# Patient Record
Sex: MALE | Race: WHITE | NOT HISPANIC OR LATINO | Employment: OTHER | URBAN - METROPOLITAN AREA
[De-identification: names, ages, dates, MRNs, and addresses within clinical notes are randomized per-mention and may not be internally consistent; named-entity substitution may affect disease eponyms.]

---

## 2019-12-18 ENCOUNTER — FOLLOW UP (OUTPATIENT)
Dept: URBAN - METROPOLITAN AREA CLINIC 79 | Facility: CLINIC | Age: 69
End: 2019-12-18

## 2019-12-18 DIAGNOSIS — H01.02B: ICD-10-CM

## 2019-12-18 DIAGNOSIS — H01.02A: ICD-10-CM

## 2019-12-18 DIAGNOSIS — H43.813: ICD-10-CM

## 2019-12-18 DIAGNOSIS — H25.13: ICD-10-CM

## 2019-12-18 PROCEDURE — 92014 COMPRE OPH EXAM EST PT 1/>: CPT

## 2019-12-18 PROCEDURE — 92136 OPHTHALMIC BIOMETRY: CPT

## 2019-12-18 PROCEDURE — 92025 CPTRIZED CORNEAL TOPOGRAPHY: CPT

## 2019-12-18 ASSESSMENT — VISUAL ACUITY
OD_CC: 20/30-1
OS_CC: 20/25+3

## 2019-12-18 ASSESSMENT — KERATOMETRY
OS_AXISANGLE2_DEGREES: 108
OD_K1POWER_DIOPTERS: 40.17
OD_K2POWER_DIOPTERS: 40.91
OS_K2POWER_DIOPTERS: 41.51
OS_AXISANGLE_DEGREES: 18
OD_AXISANGLE2_DEGREES: 69
OS_K1POWER_DIOPTERS: 40.44
OD_AXISANGLE_DEGREES: 159

## 2019-12-18 ASSESSMENT — TONOMETRY
OS_IOP_MMHG: 15
OD_IOP_MMHG: 15

## 2020-01-13 ENCOUNTER — SURGERY/PROCEDURE (OUTPATIENT)
Dept: URBAN - METROPOLITAN AREA SURGICAL CENTER 17 | Facility: SURGICAL CENTER | Age: 70
End: 2020-01-13

## 2020-01-13 DIAGNOSIS — H25.041: ICD-10-CM

## 2020-01-13 DIAGNOSIS — H25.011: ICD-10-CM

## 2020-01-13 DIAGNOSIS — H25.11: ICD-10-CM

## 2020-01-13 PROCEDURE — 66984 XCAPSL CTRC RMVL W/O ECP: CPT

## 2020-01-13 ASSESSMENT — KERATOMETRY
OD_AXISANGLE_DEGREES: 159
OS_K2POWER_DIOPTERS: 41.51
OD_K1POWER_DIOPTERS: 40.17
OS_AXISANGLE2_DEGREES: 108
OS_AXISANGLE_DEGREES: 18
OS_K1POWER_DIOPTERS: 40.44
OD_K2POWER_DIOPTERS: 40.91
OD_AXISANGLE2_DEGREES: 69

## 2020-01-14 ENCOUNTER — 1 DAY POST-OP (OUTPATIENT)
Dept: URBAN - METROPOLITAN AREA CLINIC 79 | Facility: CLINIC | Age: 70
End: 2020-01-14

## 2020-01-14 DIAGNOSIS — Z96.1: ICD-10-CM

## 2020-01-14 PROCEDURE — 99024 POSTOP FOLLOW-UP VISIT: CPT

## 2020-01-14 ASSESSMENT — KERATOMETRY
OS_AXISANGLE2_DEGREES: 108
OS_K2POWER_DIOPTERS: 41.51
OD_AXISANGLE2_DEGREES: 69
OD_K2POWER_DIOPTERS: 40.91
OD_K1POWER_DIOPTERS: 40.17
OS_AXISANGLE_DEGREES: 18
OD_AXISANGLE_DEGREES: 159
OS_K1POWER_DIOPTERS: 40.44

## 2020-01-14 ASSESSMENT — VISUAL ACUITY
OD_SC: J10
OD_SC: 20/25-1

## 2020-01-14 ASSESSMENT — TONOMETRY: OD_IOP_MMHG: 20

## 2020-01-23 ENCOUNTER — 1 WEEK POST-OP (OUTPATIENT)
Dept: URBAN - METROPOLITAN AREA CLINIC 79 | Facility: CLINIC | Age: 70
End: 2020-01-23

## 2020-01-23 DIAGNOSIS — Z96.1: ICD-10-CM

## 2020-01-23 PROCEDURE — 99024 POSTOP FOLLOW-UP VISIT: CPT

## 2020-01-23 ASSESSMENT — VISUAL ACUITY
OD_SC: J3
OD_SC: 20/25-1

## 2020-01-23 ASSESSMENT — KERATOMETRY
OS_AXISANGLE2_DEGREES: 108
OD_AXISANGLE_DEGREES: 159
OD_K1POWER_DIOPTERS: 40.17
OS_AXISANGLE_DEGREES: 18
OS_K2POWER_DIOPTERS: 41.51
OD_K2POWER_DIOPTERS: 40.91
OS_K1POWER_DIOPTERS: 40.44
OD_AXISANGLE2_DEGREES: 69

## 2020-01-23 ASSESSMENT — TONOMETRY: OD_IOP_MMHG: 20

## 2020-02-12 ENCOUNTER — ESTABLISHED COMPREHENSIVE EXAM (OUTPATIENT)
Dept: URBAN - METROPOLITAN AREA CLINIC 79 | Facility: CLINIC | Age: 70
End: 2020-02-12

## 2020-02-12 DIAGNOSIS — H52.13: ICD-10-CM

## 2020-02-12 DIAGNOSIS — Z96.1: ICD-10-CM

## 2020-02-12 PROCEDURE — 92015 DETERMINE REFRACTIVE STATE: CPT | Mod: GY

## 2020-02-12 PROCEDURE — 99024 POSTOP FOLLOW-UP VISIT: CPT

## 2020-02-12 ASSESSMENT — KERATOMETRY
OS_K1POWER_DIOPTERS: 40.44
OD_AXISANGLE2_DEGREES: 69
OD_K1POWER_DIOPTERS: 40.17
OS_K2POWER_DIOPTERS: 41.51
OS_AXISANGLE2_DEGREES: 108
OS_AXISANGLE_DEGREES: 18
OD_AXISANGLE_DEGREES: 159
OD_K2POWER_DIOPTERS: 40.91

## 2020-02-12 ASSESSMENT — TONOMETRY
OS_IOP_MMHG: 15
OD_IOP_MMHG: 15

## 2020-02-12 ASSESSMENT — VISUAL ACUITY
OD_SC: J3
OD_SC: 20/25
OS_SC: 20/40
OS_CC: J1+
OS_CC: 20/20

## 2020-06-04 ENCOUNTER — PROBLEM (OUTPATIENT)
Dept: URBAN - METROPOLITAN AREA CLINIC 79 | Facility: CLINIC | Age: 70
End: 2020-06-04

## 2020-06-04 VITALS — HEIGHT: 60 IN

## 2020-06-04 DIAGNOSIS — H00.14: ICD-10-CM

## 2020-06-04 PROCEDURE — 92012 INTRM OPH EXAM EST PATIENT: CPT

## 2020-06-04 ASSESSMENT — VISUAL ACUITY
OS_CC: 20/20
OD_SC: 20/20

## 2020-06-04 ASSESSMENT — KERATOMETRY
OD_K1POWER_DIOPTERS: 40.17
OS_K2POWER_DIOPTERS: 41.51
OS_AXISANGLE_DEGREES: 18
OD_AXISANGLE_DEGREES: 159
OS_K1POWER_DIOPTERS: 40.44
OD_AXISANGLE2_DEGREES: 69
OD_K2POWER_DIOPTERS: 40.91
OS_AXISANGLE2_DEGREES: 108

## 2020-06-16 ENCOUNTER — FOLLOW UP (OUTPATIENT)
Dept: URBAN - METROPOLITAN AREA CLINIC 79 | Facility: CLINIC | Age: 70
End: 2020-06-16

## 2020-06-16 VITALS — HEIGHT: 60 IN

## 2020-06-16 DIAGNOSIS — H00.14: ICD-10-CM

## 2020-06-16 PROCEDURE — 92012 INTRM OPH EXAM EST PATIENT: CPT

## 2020-06-16 ASSESSMENT — KERATOMETRY
OS_AXISANGLE2_DEGREES: 108
OD_AXISANGLE_DEGREES: 159
OS_K2POWER_DIOPTERS: 41.51
OD_AXISANGLE2_DEGREES: 69
OD_K1POWER_DIOPTERS: 40.17
OD_K2POWER_DIOPTERS: 40.91
OS_K1POWER_DIOPTERS: 40.44
OS_AXISANGLE_DEGREES: 18

## 2020-06-16 ASSESSMENT — VISUAL ACUITY
OD_CC: 20/25-1
OS_CC: 20/25+1

## 2020-06-16 ASSESSMENT — TONOMETRY
OD_IOP_MMHG: 16
OS_IOP_MMHG: 17

## 2020-09-08 ENCOUNTER — FOLLOW UP (OUTPATIENT)
Dept: URBAN - METROPOLITAN AREA CLINIC 79 | Facility: CLINIC | Age: 70
End: 2020-09-08

## 2020-09-08 DIAGNOSIS — H26.491: ICD-10-CM

## 2020-09-08 DIAGNOSIS — Z96.1: ICD-10-CM

## 2020-09-08 PROCEDURE — 92014 COMPRE OPH EXAM EST PT 1/>: CPT

## 2020-09-08 ASSESSMENT — KERATOMETRY
OS_AXISANGLE2_DEGREES: 108
OS_AXISANGLE_DEGREES: 18
OD_K2POWER_DIOPTERS: 40.91
OD_K1POWER_DIOPTERS: 40.17
OS_K2POWER_DIOPTERS: 41.51
OD_AXISANGLE_DEGREES: 159
OS_K1POWER_DIOPTERS: 40.44
OD_AXISANGLE2_DEGREES: 69

## 2020-09-08 ASSESSMENT — TONOMETRY
OD_IOP_MMHG: 16
OS_IOP_MMHG: 15

## 2020-09-08 ASSESSMENT — VISUAL ACUITY
OS_CC: 20/20
OD_CC: 20/20-1

## 2021-01-12 ENCOUNTER — ESTABLISHED COMPREHENSIVE EXAM (OUTPATIENT)
Dept: URBAN - METROPOLITAN AREA CLINIC 79 | Facility: CLINIC | Age: 71
End: 2021-01-12

## 2021-01-12 VITALS — HEIGHT: 60 IN

## 2021-01-12 DIAGNOSIS — Z96.1: ICD-10-CM

## 2021-01-12 DIAGNOSIS — H52.12: ICD-10-CM

## 2021-01-12 DIAGNOSIS — H52.01: ICD-10-CM

## 2021-01-12 PROCEDURE — 92015 DETERMINE REFRACTIVE STATE: CPT

## 2021-01-12 PROCEDURE — 92014 COMPRE OPH EXAM EST PT 1/>: CPT

## 2021-01-12 ASSESSMENT — VISUAL ACUITY
OD_SC: 20/25
OD_CC: 20/20
OS_SC: 20/30
OS_CC: J1+
OS_CC: 20/25
OD_CC: J1+

## 2021-01-12 ASSESSMENT — KERATOMETRY
OS_AXISANGLE_DEGREES: 18
OD_K1POWER_DIOPTERS: 40.17
OD_K2POWER_DIOPTERS: 40.91
OD_AXISANGLE2_DEGREES: 69
OS_K2POWER_DIOPTERS: 41.51
OS_K1POWER_DIOPTERS: 40.44
OS_AXISANGLE2_DEGREES: 108
OD_AXISANGLE_DEGREES: 159

## 2021-01-12 ASSESSMENT — TONOMETRY
OD_IOP_MMHG: 14
OS_IOP_MMHG: 14

## 2021-06-22 ENCOUNTER — FOLLOW UP (OUTPATIENT)
Dept: URBAN - METROPOLITAN AREA CLINIC 79 | Facility: CLINIC | Age: 71
End: 2021-06-22

## 2021-06-22 DIAGNOSIS — H25.12: ICD-10-CM

## 2021-06-22 PROCEDURE — 92014 COMPRE OPH EXAM EST PT 1/>: CPT

## 2021-06-22 ASSESSMENT — KERATOMETRY
OS_K2POWER_DIOPTERS: 41.51
OS_AXISANGLE_DEGREES: 18
OS_AXISANGLE2_DEGREES: 108
OS_K1POWER_DIOPTERS: 40.44
OD_AXISANGLE_DEGREES: 159
OD_K1POWER_DIOPTERS: 40.17
OD_K2POWER_DIOPTERS: 40.91
OD_AXISANGLE2_DEGREES: 69

## 2021-06-22 ASSESSMENT — VISUAL ACUITY
OD_CC: 20/20
OS_CC: 20/20-2

## 2022-12-13 ENCOUNTER — FOLLOW UP (OUTPATIENT)
Dept: URBAN - METROPOLITAN AREA CLINIC 79 | Facility: CLINIC | Age: 72
End: 2022-12-13

## 2022-12-13 DIAGNOSIS — H25.12: ICD-10-CM

## 2022-12-13 DIAGNOSIS — H26.491: ICD-10-CM

## 2022-12-13 PROCEDURE — 92134 CPTRZ OPH DX IMG PST SGM RTA: CPT | Mod: NC

## 2022-12-13 PROCEDURE — 92014 COMPRE OPH EXAM EST PT 1/>: CPT

## 2022-12-13 ASSESSMENT — TONOMETRY
OS_IOP_MMHG: 9
OD_IOP_MMHG: 11

## 2022-12-13 ASSESSMENT — KERATOMETRY
OS_AXISANGLE2_DEGREES: 108
OS_K2POWER_DIOPTERS: 41.51
OD_AXISANGLE_DEGREES: 159
OD_K1POWER_DIOPTERS: 40.17
OS_AXISANGLE_DEGREES: 18
OD_K2POWER_DIOPTERS: 40.91
OS_K1POWER_DIOPTERS: 40.44
OD_AXISANGLE2_DEGREES: 69

## 2022-12-13 ASSESSMENT — VISUAL ACUITY
OD_SC: 20/30+2
OD_CC: 20/25-2
OS_CC: 20/20-1

## 2022-12-20 ENCOUNTER — PROCEDURE ONLY (OUTPATIENT)
Dept: URBAN - METROPOLITAN AREA CLINIC 79 | Facility: CLINIC | Age: 72
End: 2022-12-20

## 2022-12-20 DIAGNOSIS — H26.491: ICD-10-CM

## 2022-12-20 PROCEDURE — 66821 AFTER CATARACT LASER SURGERY: CPT

## 2022-12-20 ASSESSMENT — KERATOMETRY
OS_AXISANGLE2_DEGREES: 108
OS_K1POWER_DIOPTERS: 40.44
OD_K1POWER_DIOPTERS: 40.17
OD_K2POWER_DIOPTERS: 40.91
OD_AXISANGLE2_DEGREES: 69
OS_K2POWER_DIOPTERS: 41.51
OD_AXISANGLE_DEGREES: 159
OS_AXISANGLE_DEGREES: 18

## 2022-12-20 ASSESSMENT — TONOMETRY: OD_IOP_MMHG: 17

## 2022-12-20 ASSESSMENT — VISUAL ACUITY
OS_CC: 20/20-1
OD_SC: 20/25+2

## 2023-01-03 ENCOUNTER — POST-OP CHECK (OUTPATIENT)
Dept: URBAN - METROPOLITAN AREA CLINIC 79 | Facility: CLINIC | Age: 73
End: 2023-01-03

## 2023-01-03 DIAGNOSIS — Z96.1: ICD-10-CM

## 2023-01-03 PROCEDURE — 99024 POSTOP FOLLOW-UP VISIT: CPT

## 2023-01-03 ASSESSMENT — KERATOMETRY
OS_K1POWER_DIOPTERS: 40.44
OD_K1POWER_DIOPTERS: 40.17
OD_AXISANGLE_DEGREES: 159
OS_AXISANGLE_DEGREES: 18
OD_AXISANGLE2_DEGREES: 69
OD_K2POWER_DIOPTERS: 40.91
OS_K2POWER_DIOPTERS: 41.51
OS_AXISANGLE2_DEGREES: 108

## 2023-01-03 ASSESSMENT — VISUAL ACUITY
OS_CC: 20/20
OD_SC: 20/20

## 2023-01-03 ASSESSMENT — TONOMETRY
OS_IOP_MMHG: 16
OD_IOP_MMHG: 16
OD_IOP_MMHG: 15
OS_IOP_MMHG: 15

## 2023-07-11 ENCOUNTER — ESTABLISHED COMPREHENSIVE EXAM (OUTPATIENT)
Dept: URBAN - METROPOLITAN AREA CLINIC 79 | Facility: CLINIC | Age: 73
End: 2023-07-11

## 2023-07-11 DIAGNOSIS — H25.12: ICD-10-CM

## 2023-07-11 DIAGNOSIS — H52.12: ICD-10-CM

## 2023-07-11 DIAGNOSIS — H43.813: ICD-10-CM

## 2023-07-11 PROCEDURE — 99214 OFFICE O/P EST MOD 30 MIN: CPT

## 2023-07-11 PROCEDURE — 92015 DETERMINE REFRACTIVE STATE: CPT

## 2023-07-11 ASSESSMENT — KERATOMETRY
OS_AXISANGLE2_DEGREES: 108
OD_AXISANGLE_DEGREES: 159
OS_K1POWER_DIOPTERS: 40.44
OS_AXISANGLE_DEGREES: 18
OD_K1POWER_DIOPTERS: 40.17
OD_AXISANGLE2_DEGREES: 69
OD_K2POWER_DIOPTERS: 40.91
OS_K2POWER_DIOPTERS: 41.51

## 2023-07-11 ASSESSMENT — TONOMETRY
OS_IOP_MMHG: 11
OD_IOP_MMHG: 14

## 2023-07-11 ASSESSMENT — VISUAL ACUITY
OD_SC: 20/25+2
OS_CC: 20/25+2
OS_CC: 20/20
OS_SC: 20/25-2
OD_SC: 20/50

## 2024-07-16 ENCOUNTER — ESTABLISHED COMPREHENSIVE EXAM (OUTPATIENT)
Dept: URBAN - METROPOLITAN AREA CLINIC 79 | Facility: CLINIC | Age: 74
End: 2024-07-16

## 2024-07-16 DIAGNOSIS — H43.813: ICD-10-CM

## 2024-07-16 DIAGNOSIS — H25.812: ICD-10-CM

## 2024-07-16 DIAGNOSIS — H16.223: ICD-10-CM

## 2024-07-16 PROCEDURE — 92014 COMPRE OPH EXAM EST PT 1/>: CPT

## 2024-07-16 ASSESSMENT — KERATOMETRY
OD_K2POWER_DIOPTERS: 40.91
OS_K2POWER_DIOPTERS: 41.51
OS_K1POWER_DIOPTERS: 40.44
OS_AXISANGLE2_DEGREES: 108
OD_AXISANGLE_DEGREES: 159
OS_AXISANGLE_DEGREES: 18
OD_AXISANGLE2_DEGREES: 69
OD_K1POWER_DIOPTERS: 40.17

## 2024-07-16 ASSESSMENT — VISUAL ACUITY
OS_CC: 20/20
OS_CC: J1+
OD_SC: 20/20
OD_SC: J6

## 2024-07-16 ASSESSMENT — TONOMETRY
OD_IOP_MMHG: 10
OS_IOP_MMHG: 11

## 2025-01-24 ENCOUNTER — HOSPITAL ENCOUNTER (OUTPATIENT)
Dept: HOSPITAL 99 - RAD | Age: 75
End: 2025-01-24
Payer: COMMERCIAL

## 2025-01-24 DIAGNOSIS — R22.42: Primary | ICD-10-CM

## 2025-01-31 ENCOUNTER — HOSPITAL ENCOUNTER (OUTPATIENT)
Dept: HOSPITAL 99 - RAD | Age: 75
End: 2025-01-31
Payer: COMMERCIAL

## 2025-01-31 DIAGNOSIS — I77.0: Primary | ICD-10-CM

## 2025-02-26 LAB
APTT PPP: 30.5 SEC (ref 23.4–35)
BUN SERPL-MCNC: 21 MG/DL (ref 9–20)
CALCIUM SERPL-MCNC: 10.2 MG/DL (ref 8.4–10.2)
CHLORIDE SERPL-SCNC: 105 MMOL/L (ref 98–107)
CO2 SERPL-SCNC: 30 MMOL/L (ref 22–30)
EGFR: > 60
ERYTHROCYTE [DISTWIDTH] IN BLOOD BY AUTOMATED COUNT: 13.7 % (ref 11.5–14.5)
ESTIMATED CREATININE CLEARANCE: 72 ML/MIN
GLUCOSE SERPL-MCNC: 98 MG/DL (ref 70–99)
HCT VFR BLD AUTO: 42.5 % (ref 39–52)
HGB BLD-MCNC: 14.2 G/DL (ref 13–18)
INR PPP: 1.04
MCHC RBC AUTO-ENTMCNC: 33.4 G/DL (ref 33–37)
MCV RBC AUTO: 94.9 FL (ref 80–94)
NRBC BLD AUTO-RTO: 0 %
PLATELET # BLD AUTO: 114 10^3/UL (ref 130–400)
POTASSIUM SERPL-SCNC: 5.2 MMOL/L (ref 3.5–5.1)
PROTHROMBIN TIME: 14.1 SEC (ref 11.4–14.6)
SODIUM SERPL-SCNC: 138 MMOL/L (ref 135–145)

## 2025-03-04 ENCOUNTER — HOSPITAL ENCOUNTER (OUTPATIENT)
Dept: HOSPITAL 99 - CATH | Age: 75
Discharge: HOME | End: 2025-03-04
Payer: COMMERCIAL

## 2025-03-04 VITALS — RESPIRATION RATE: 9 BRPM | DIASTOLIC BLOOD PRESSURE: 76 MMHG | SYSTOLIC BLOOD PRESSURE: 126 MMHG

## 2025-03-04 VITALS — RESPIRATION RATE: 14 BRPM | SYSTOLIC BLOOD PRESSURE: 135 MMHG | DIASTOLIC BLOOD PRESSURE: 70 MMHG

## 2025-03-04 VITALS — RESPIRATION RATE: 16 BRPM | DIASTOLIC BLOOD PRESSURE: 61 MMHG | OXYGEN SATURATION: 2 %

## 2025-03-04 VITALS — RESPIRATION RATE: 12 BRPM | SYSTOLIC BLOOD PRESSURE: 181 MMHG | DIASTOLIC BLOOD PRESSURE: 76 MMHG

## 2025-03-04 VITALS — RESPIRATION RATE: 10 BRPM

## 2025-03-04 VITALS — DIASTOLIC BLOOD PRESSURE: 61 MMHG | SYSTOLIC BLOOD PRESSURE: 142 MMHG | RESPIRATION RATE: 13 BRPM

## 2025-03-04 VITALS — RESPIRATION RATE: 13 BRPM

## 2025-03-04 VITALS — RESPIRATION RATE: 18 BRPM | DIASTOLIC BLOOD PRESSURE: 83 MMHG | SYSTOLIC BLOOD PRESSURE: 125 MMHG

## 2025-03-04 VITALS — OXYGEN SATURATION: 1 %

## 2025-03-04 VITALS — OXYGEN SATURATION: 2 %

## 2025-03-04 VITALS — RESPIRATION RATE: 12 BRPM | SYSTOLIC BLOOD PRESSURE: 139 MMHG | DIASTOLIC BLOOD PRESSURE: 84 MMHG

## 2025-03-04 VITALS — RESPIRATION RATE: 13 BRPM | DIASTOLIC BLOOD PRESSURE: 67 MMHG | SYSTOLIC BLOOD PRESSURE: 166 MMHG

## 2025-03-04 VITALS — DIASTOLIC BLOOD PRESSURE: 74 MMHG | RESPIRATION RATE: 15 BRPM | SYSTOLIC BLOOD PRESSURE: 160 MMHG

## 2025-03-04 VITALS — RESPIRATION RATE: 12 BRPM

## 2025-03-04 VITALS — DIASTOLIC BLOOD PRESSURE: 64 MMHG | RESPIRATION RATE: 9 BRPM | SYSTOLIC BLOOD PRESSURE: 134 MMHG

## 2025-03-04 VITALS — SYSTOLIC BLOOD PRESSURE: 138 MMHG | DIASTOLIC BLOOD PRESSURE: 72 MMHG | RESPIRATION RATE: 11 BRPM

## 2025-03-04 VITALS — SYSTOLIC BLOOD PRESSURE: 142 MMHG | DIASTOLIC BLOOD PRESSURE: 61 MMHG | OXYGEN SATURATION: 1 %

## 2025-03-04 VITALS — RESPIRATION RATE: 11 BRPM

## 2025-03-04 VITALS — RESPIRATION RATE: 18 BRPM

## 2025-03-04 VITALS — BODY MASS INDEX: 28 KG/M2

## 2025-03-04 VITALS — RESPIRATION RATE: 14 BRPM

## 2025-03-04 DIAGNOSIS — I87.2: Primary | ICD-10-CM

## 2025-03-04 DIAGNOSIS — I83.892: ICD-10-CM

## 2025-03-04 PROCEDURE — 88304 TISSUE EXAM BY PATHOLOGIST: CPT

## 2025-03-04 PROCEDURE — 37700 LIGATION&DIV LONG SAPH VEIN: CPT

## 2025-03-04 PROCEDURE — 36475 ENDOVENOUS RF 1ST VEIN: CPT

## 2025-03-04 PROCEDURE — C1769 GUIDE WIRE: HCPCS

## 2025-03-04 RX ADMIN — CHLORHEXIDINE GLUCONATE 0.12% ORAL RINSE 15 ML: 1.2 LIQUID ORAL at 07:10

## 2025-03-04 RX ADMIN — MUPIROCIN 1 GRAM: 20 OINTMENT TOPICAL at 07:10

## 2025-03-04 NOTE — OR.RPT
"Operative Report"~"Operative Report"~"-: "~"Date of Operation: 3/4/2025"~"Pre Op Diagnosis:  "~"1.  Left great saphenous vein aneurysm"~"2.  Symptomatic left lower extremity venous insufficiency"~"Post Op Diagnosis: "~"1.  Left great saphenous vein aneurysm"~"2.  Symptomatic left lower extremity venous insufficiency"~"Procedure:  "~"1.  Ligation and division of left saphenofemoral junction "~"2.  Resection of great saphenous vein aneurysm"~"3.  Radiofrequency endovenous ablation of left great saphenous vein (distal calf puncture site)"~"Surgeon: Jose Salguero III, MD"~"Assistant: Efrain Barger MD, PGY4"~"Anesthesia: General"~"Complications: None"~"Estimated Blood Loss: Minimal"~"History and Indications for Procedure: 75-year-old male with symptomatic venous insufficiency involving the left great saphenous vein along with a left great saphenous vein aneurysm."~"Procedure in Detail: Jose Lee was correctly identified and placed supine on the operating table.  After adequate induction of anesthesia the left leg was frog-legged and the table placed into a reverse Trendelenburg position.  The left leg was "~"circumferentially prepped and draped in the usual sterile fashion.  A timeout procedure was performed with the nursing and anesthesia staff confirming the patient's identity as well as the nature and laterality of the procedure."~"The left great saphenous vein was identified using ultrasound guidance.  The vein was visualized from the distal calf to the saphenofemoral junction.  The saphenofemoral junction was identified along with the location of the great saphenous vein "~"aneurysm several centimeters distal to this on the proximal thigh.  An appropriate site for percutaneous access was identified at the distal calf.  Local anesthesia was infiltrated into the proposed puncture site.  The left great saphenous vein was "~"accessed with a micropuncture needle under ultrasound guidance and the 7 Congolese sheath was placed.  Under direct ultrasound guidance the 100 cm length /7 cm tip radiofrequency ablation catheter was advanced towards the saphenofemoral junction.  "~"Using a real-time direct ultrasound measurement the tip of the catheter was positioned in the mid thigh just distal to the great saphenous vein aneurysm.  The position of the catheter was then externally marked using the white plastic doughnut on "~"the catheter at the sheath exit site.  "~"Rather than make 1 large incision to perform the saphenofemoral junction ligation and resection of the saphenous vein aneurysm I elected to perform 2 smaller skip incisions.  The first incision was made over the great saphenous vein aneurysm.  "~"Electrocautery and sharp dissection were used to expose the great saphenous vein aneurysm.  The proximal and distal great saphenous vein relative to the aneurysm were identified.  A silk tie was placed on the distal great saphenous vein and secured. "~" Prior to this, using digital manipulation I pulled back and positioned the radiofrequency catheter tip in the distal vein out of the way of our silk tie.  The other incision was made vertically over the saphenofemoral junction.  Electrocautery and "~"sharp dissection were used to expose the saphenofemoral junction.  Communicating venous branches were ligated and divided between silk ties.  The saphenofemoral junction was clearly exposed.  A heavy silk tie was placed on the more distal portion of "~"the great saphenous vein and secured.  A right angle clamp was placed at the saphenofemoral junction.  The saphenofemoral junction was divided.  The stump of the saphenofemoral junction was then suture-ligated with a silk suture ligature.  The vein "~"segment in the tunnel was circumferentially dissected free using sharp dissection and electrocautery.  We then transected the vein distal to the aneurysm and removed the entire segment of great saphenous vein from the saphenofemoral junction down to "~"the vein segment distal to the aneurysm.  This was sent to pathology."~"Using ultrasound guidance Tumescent solution was then infiltrated circumferentially around the left great saphenous vein from the sheath insertion site to the tip of the catheter at the end of the ligated great saphenous vein in the thigh.  At this "~"point the table was flattened out.  The left great saphenous vein was then ablated using 2 treatment cycles at each segment.  Once completed the sheath and catheter were removed.  Direct manual pressure was held on the puncture site and hemostasis "~"was achieved.  A sterile dressing was applied."~"A #10 MAYRA drain was left in the tunnel between the 2 incisions in the thigh.  This was brought out through a separate stab incision at the skin and secured in place with a nylon suture.  Hemostasis was achieved in both wound beds.  The wounds were "~"irrigated with saline solution.  The wounds were each individually closed in layers.  Running Monocryl suture was used to reapproximate the skin and sterile skin glue was applied to each incision.  The MAYRA was connected to bulb suction."~"The patient's leg was cleaned and then wrapped with an Ace wrap from the toes to the proximal thigh.  The patient tolerated the procedure well was taken to the recovery room in good condition."~"Attestation: I was present and responsible for the entire procedure"~"Signed: "~"Jose Salguero III, MD"~"[facility]"~"[phone] (zabs)"

## 2025-03-04 NOTE — W.SUR.PREOP
"Pre-Operative Surgical Note"~"-"~"-: "~"I have examined this patient prior to the performance of the scheduled procedure. "~"The patient's condition is unchanged from the time of the current History and "~"Physical and the patient is able to undergo the scheduled procedure."

## 2025-03-07 ENCOUNTER — HOSPITAL ENCOUNTER (OUTPATIENT)
Dept: HOSPITAL 99 - DHVS | Age: 75
End: 2025-03-07
Payer: COMMERCIAL

## 2025-03-07 DIAGNOSIS — I87.2: ICD-10-CM

## 2025-03-07 DIAGNOSIS — I86.8: Primary | ICD-10-CM

## (undated) RX ORDER — DUREZOL 0.5 MG/ML
1 EMULSION OPHTHALMIC TWICE A DAY
Start: 2020-01-10 | End: 2020-02-10

## (undated) RX ORDER — TOBRAMYCIN AND DEXAMETHASONE 3; 1 MG/G; MG/G: 1/2 OINTMENT OPHTHALMIC TWICE A DAY

## (undated) RX ORDER — HYDROXYZINE HYDROCHLORIDE 25 MG/1
1 TABLET, FILM COATED ORAL TWICE A DAY
Start: 2020-01-10 | End: 2020-01-23

## (undated) RX ORDER — BROMFENAC 0.76 MG/ML: 1 SOLUTION/ DROPS OPHTHALMIC ONCE A DAY

## (undated) RX ORDER — BROMFENAC SODIUM 0.7 MG/ML
1 SOLUTION/ DROPS OPHTHALMIC ONCE A DAY
Start: 2020-01-10 | End: 2020-03-13